# Patient Record
Sex: FEMALE | Race: OTHER | HISPANIC OR LATINO | Employment: FULL TIME | URBAN - METROPOLITAN AREA
[De-identification: names, ages, dates, MRNs, and addresses within clinical notes are randomized per-mention and may not be internally consistent; named-entity substitution may affect disease eponyms.]

---

## 2021-06-16 ENCOUNTER — HISTORICAL (OUTPATIENT)
Dept: RADIOLOGY | Facility: HOSPITAL | Age: 41
End: 2021-06-16

## 2021-07-01 ENCOUNTER — HISTORICAL (OUTPATIENT)
Dept: RADIOLOGY | Facility: HOSPITAL | Age: 41
End: 2021-07-01

## 2021-07-20 ENCOUNTER — HISTORICAL (OUTPATIENT)
Dept: RADIOLOGY | Facility: HOSPITAL | Age: 41
End: 2021-07-20

## 2021-09-02 ENCOUNTER — HISTORICAL (OUTPATIENT)
Dept: ADMINISTRATIVE | Facility: HOSPITAL | Age: 41
End: 2021-09-02

## 2021-09-08 ENCOUNTER — HISTORICAL (OUTPATIENT)
Dept: ADMINISTRATIVE | Facility: HOSPITAL | Age: 41
End: 2021-09-08

## 2021-09-08 LAB — SARS-COV-2 AG RESP QL IA.RAPID: POSITIVE

## 2021-09-09 ENCOUNTER — HISTORICAL (OUTPATIENT)
Dept: INFECTIOUS DISEASES | Facility: HOSPITAL | Age: 41
End: 2021-09-09

## 2021-10-07 ENCOUNTER — HISTORICAL (OUTPATIENT)
Dept: ADMINISTRATIVE | Facility: HOSPITAL | Age: 41
End: 2021-10-07

## 2021-10-12 ENCOUNTER — HISTORICAL (OUTPATIENT)
Dept: ADMINISTRATIVE | Facility: HOSPITAL | Age: 41
End: 2021-10-12

## 2021-10-12 LAB — SARS-COV-2 AG RESP QL IA.RAPID: NEGATIVE

## 2021-10-13 ENCOUNTER — HISTORICAL (OUTPATIENT)
Dept: SURGERY | Facility: HOSPITAL | Age: 41
End: 2021-10-13

## 2021-11-03 LAB — POC BETA-HCG (QUAL): POSITIVE

## 2022-04-10 ENCOUNTER — HISTORICAL (OUTPATIENT)
Dept: ADMINISTRATIVE | Facility: HOSPITAL | Age: 42
End: 2022-04-10

## 2022-04-26 VITALS
BODY MASS INDEX: 26.27 KG/M2 | HEIGHT: 60 IN | SYSTOLIC BLOOD PRESSURE: 113 MMHG | WEIGHT: 133.81 LBS | DIASTOLIC BLOOD PRESSURE: 73 MMHG | OXYGEN SATURATION: 98 %

## 2022-05-17 ENCOUNTER — HOSPITAL ENCOUNTER (OUTPATIENT)
Dept: RADIOLOGY | Facility: HOSPITAL | Age: 42
Discharge: HOME | End: 2022-05-17
Attending: STUDENT IN AN ORGANIZED HEALTH CARE EDUCATION/TRAINING PROGRAM

## 2022-05-17 DIAGNOSIS — N63.0 BREAST LUMP: ICD-10-CM

## 2022-05-17 DIAGNOSIS — D48.60: ICD-10-CM

## 2022-05-17 PROCEDURE — 76642 ULTRASOUND BREAST LIMITED: CPT | Mod: 26,RT,, | Performed by: RADIOLOGY

## 2022-05-17 PROCEDURE — 77066 MAMMO DIGITAL DIAGNOSTIC BILAT WITH TOMO: ICD-10-PCS | Mod: 26,,, | Performed by: RADIOLOGY

## 2022-05-17 PROCEDURE — 77066 DX MAMMO INCL CAD BI: CPT | Mod: 26,,, | Performed by: RADIOLOGY

## 2022-05-17 PROCEDURE — 76642 ULTRASOUND BREAST LIMITED: CPT | Mod: TC,RT

## 2022-05-17 PROCEDURE — 77062 MAMMO DIGITAL DIAGNOSTIC BILAT WITH TOMO: ICD-10-PCS | Mod: 26,,, | Performed by: RADIOLOGY

## 2022-05-17 PROCEDURE — 77062 BREAST TOMOSYNTHESIS BI: CPT | Mod: 26,,, | Performed by: RADIOLOGY

## 2022-05-17 PROCEDURE — 77066 DX MAMMO INCL CAD BI: CPT | Mod: TC

## 2022-05-17 PROCEDURE — 76642 US BREAST RIGHT LIMITED: ICD-10-PCS | Mod: 26,RT,, | Performed by: RADIOLOGY

## 2022-05-26 ENCOUNTER — OFFICE VISIT (OUTPATIENT)
Dept: SURGERY | Facility: CLINIC | Age: 42
End: 2022-05-26

## 2022-05-26 VITALS
BODY MASS INDEX: 26.02 KG/M2 | HEART RATE: 83 BPM | RESPIRATION RATE: 18 BRPM | OXYGEN SATURATION: 97 % | DIASTOLIC BLOOD PRESSURE: 80 MMHG | TEMPERATURE: 98 F | SYSTOLIC BLOOD PRESSURE: 130 MMHG | HEIGHT: 61 IN | WEIGHT: 137.81 LBS

## 2022-05-26 DIAGNOSIS — Z85.3 H/O MALIGNANT PHYLLOIDES TUMOR OF BREAST: Primary | ICD-10-CM

## 2022-05-26 DIAGNOSIS — Z01.818 PRE-OP TESTING: ICD-10-CM

## 2022-05-26 PROCEDURE — 99214 OFFICE O/P EST MOD 30 MIN: CPT | Mod: PBBFAC

## 2022-05-26 RX ORDER — HEPARIN SODIUM 5000 [USP'U]/ML
5000 INJECTION, SOLUTION INTRAVENOUS; SUBCUTANEOUS EVERY 8 HOURS
Status: CANCELLED | OUTPATIENT
Start: 2022-05-26

## 2022-05-26 RX ORDER — SODIUM CHLORIDE 9 MG/ML
INJECTION, SOLUTION INTRAVENOUS CONTINUOUS
Status: CANCELLED | OUTPATIENT
Start: 2022-05-26

## 2022-05-26 NOTE — PROGRESS NOTES
\[facility]\"" General Surgery   Clinic Note    Subjective:  Pt is a 42 yr old female with no significant PMH who presented to the breast surgery clinic for follow-up of imaging and scheduling of excisional biopsy. Pt developed a 2.2x1.5x2.5 right-sided breast mass a year ago located at the 10 o'clock position of the nipple, above the areola. No nipple drainage or pain from the mass. 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 Pt was scheduled twice to do an excisional biopsy of the right breast mass but had to schedule due to covid and spontaneous .     Menarche at 13. Still has periods and are regular. Has never been on hormonal medication, including birth control. Has had 2 completed vaginal pregnancies (, ). Has had one spontaneous  on 21. No family history of breast cancer, uterine cancer, and ovarian cancer.     Today, pt is doing well today. Pt denies right breast mass is growing in size. No new masses, nipple drainage, overlying skin changes, weight loss, and night sweats.      Objective:    Vitals:  Vitals:    22 1416   BP: 130/80   Pulse: 83   Resp: 18   Temp: 98.2 °F (36.8 °C)        Physical Exam:  Gen: NAD  Neuro: awake, alert, answering questions appropriately  CV: RRR  Resp: non-labored breathing, JULISSA  Abd: soft, ND, NT  Ext: moves all 4 spontaneously and purposefully  Skin: warm, well perfused  Breast: No tenderness, discharge, dimpling, puckering of nipple. [id number] mass on the right breast at the 10 o clock position     Labs:  N/A     Imaging:  Mammo Digital Diagnostic B/L with Gautam (22):   IMPRESSION: SUSPICIOUS OF MALIGNANCY  Right Breast: Suspicious abnormality (BI-RADS 4)  Left Breast: Negative (BI-RADS 1)    *Ultrasound (22):   Ultrasound Findings:  Targeted ultrasound was performed by a sonographer and a physician.  There is a 2.9 x 1.9 x 3 cm oval, parallel, hypoechoic mass with slight angular and indistinct  margins, peripheral vascularity, and posterior enhancement in the upper outer right breast at the 10:00 position, 2 cm from the nipple.  Echogenic focus is noted within this mass correlating with the hydromark biopsy clip.  It previously measured approximately 2.2 x 1.4 x 2.6 cm on 2021.   No abnormal right axillary lymph node is identified.    Micro/Path/Other:  *US guided core biopsy on right upper outer quadrant breast 2.2x1.4x2.6cm mass at 10 o clock position, 2cm from nipple (21):  Fibroepithelial lesion with cellular stroma and usual ductal hyperplasia, suggestive of a benign lesion such as a benign phyllodes tumor     Assessment/Plan:  Pt is a 42 yr old female with no significant PMH who presented to the breast surgery clinic for follow-up of imaging and scheduling of excisional biopsy. Pt developed a 2.2x1.5x2.5 right-sided breast mass a year ago located at the 10 o'clock position of the nipple, above the areola. No nipple drainage or pain from the mass. 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 Pt was scheduled twice to do an excisional biopsy of the right breast mass but had to schedule due to covid and spontaneous .     - Excisional biopsy of right breast mass on      Carolin Wade, MS3 [facility]-NO         PGY3 Addendum:  I examined the patient w/ MS3 Carolin Wade and agree w/ her documentation above. My brief assessment follows:    41 yo F w/ no PMH/PSH w/ h/o palpable R breast mass x1 year. No breast skin changes, nipple discharge axillary masses. Imaging last year revealed BI-RADS 4 lesion. Subsequent core needle bx showed benign phylloides tumor. Scheduled for surgery twice, but cancelled 2/2 COVID and then pregnancy loss. Presents again today for reschedule surgery. Repeat imaging since last visit shows growth of lesion from 2/2 cm to 2/9 cm.     Scheduled for excisional bx 22. Consent obtained today in clinic.      Salvador  Scheuermann, MD   [facility] General Surgery, PGY3  05/26/2022 3:08 PM

## 2022-05-27 NOTE — PROGRESS NOTES
I have reviewed the notes, assessments, and/or procedures performed by the resident, I concur with her/his documentation of Jovani Ward.     Margarita Wang MD

## 2022-06-06 ENCOUNTER — OFFICE VISIT (OUTPATIENT)
Dept: PREADMISSION TESTING | Facility: HOSPITAL | Age: 42
End: 2022-06-06
Attending: SURGERY

## 2022-06-06 VITALS — WEIGHT: 140.19 LBS | BODY MASS INDEX: 26.49 KG/M2

## 2022-06-06 DIAGNOSIS — Z01.818 PRE-OP TESTING: ICD-10-CM

## 2022-06-06 LAB — SARS-COV-2 RNA RESP QL NAA+PROBE: DETECTED

## 2022-06-06 PROCEDURE — 87635 SARS-COV-2 COVID-19 AMP PRB: CPT

## 2022-06-06 NOTE — PROGRESS NOTES
Pre op visit for procedure. Pre op assessment done and labs drawn Pre op instructions discussed and patient verbalized undestanding

## 2022-07-14 ENCOUNTER — OFFICE VISIT (OUTPATIENT)
Dept: SURGERY | Facility: CLINIC | Age: 42
End: 2022-07-14

## 2022-07-14 VITALS
HEART RATE: 55 BPM | TEMPERATURE: 98 F | DIASTOLIC BLOOD PRESSURE: 78 MMHG | WEIGHT: 137.56 LBS | SYSTOLIC BLOOD PRESSURE: 120 MMHG | RESPIRATION RATE: 18 BRPM | HEIGHT: 61 IN | BODY MASS INDEX: 25.97 KG/M2 | OXYGEN SATURATION: 100 %

## 2022-07-14 DIAGNOSIS — N63.10 BREAST MASS, RIGHT: ICD-10-CM

## 2022-07-14 DIAGNOSIS — Z85.3 H/O MALIGNANT PHYLLOIDES TUMOR OF BREAST: Primary | ICD-10-CM

## 2022-07-14 PROCEDURE — 99215 OFFICE O/P EST HI 40 MIN: CPT | Mod: PBBFAC

## 2022-07-14 RX ORDER — SODIUM CHLORIDE 9 MG/ML
INJECTION, SOLUTION INTRAVENOUS CONTINUOUS
Status: CANCELLED | OUTPATIENT
Start: 2022-07-14

## 2022-07-14 RX ORDER — ONDANSETRON 4 MG/1
8 TABLET, ORALLY DISINTEGRATING ORAL EVERY 8 HOURS PRN
Status: CANCELLED | OUTPATIENT
Start: 2022-07-14

## 2022-07-14 NOTE — PROGRESS NOTES
"Chief complaint: rescheduling excisional biopsy     HPI:   Jovani Ward is a South African speaking  42 y.o. female with no significant PMHx reports today to reschedule surgery for excision of R breast mass.  Pt core biopsy revealed phyllodes tumor. Previously scheduled for lumpectomy 22 was cancelled due to COVID-19 positive. Pt denies any noticeable changes to the size of the mass, nipple drainage, discoloration, or new growths. She does report pain associated with the mass which she describes as "throbbing." Denies radiation of pain to any other areas. Denies fhx of breast cancer, ovarian cancer, or uterine cancer.     ROS:  Negative except for as stated above       PMHx:  has no past medical history on file.  PSHx:  has no past surgical history on file.  FamHx: family history is not on file.  SocHx:  reports that she has never smoked. She has never used smokeless tobacco. She reports previous alcohol use of about 2.0 - 3.0 standard drinks of alcohol per week. She reports that she does not use drugs.    Physical Exam:  Vitals: There were no vitals taken for this visit.  General: No acute distress   Skin: no rash or edema noted      Imagin2021: US-guided core biopsy of a right upper outer breast 2.2 x 1.4 x 2.6 cm mass at the 10 o'clock position, 2 cm from the nipple, demarcated by a hydromark butterfly-shaped biopsy clip with pathology revealing:  Fibroepithelial lesion with cellular stroma and usual ductal hyperplasia, suggestive of a benign lesion such as a benign phyllodes tumor.     Mammo Digital Diagnostic B/L with Gautam (22):   IMPRESSION: SUSPICIOUS OF MALIGNANCY  Right Breast: Suspicious abnormality (BI-RADS 4)  Left Breast: Negative (BI-RADS 1)     *Ultrasound (22):   Ultrasound Findings:  Targeted ultrasound was performed by a sonographer and a physician.  There is a 2.9 x 1.9 x 3 cm oval, parallel, hypoechoic mass with slight angular and indistinct margins, peripheral " vascularity, and posterior enhancement in the upper outer right breast at the 10:00 position, 2 cm from the nipple.  Echogenic focus is noted within this mass correlating with the hydromark biopsy clip.  It previously measured approximately 2.2 x 1.4 x 2.6 cm on 07/01/2021.   No abnormal right axillary lymph node is identified    Assessment and Plan:    Ms. Osborn is a 41 y/o F with a R sided breast mass, pathology consistent with phyllodes tumor.      -Scheduled excisional biopsy for 8/10/22 (on vacation at end of July and would like to schedule after)   -Received informed consent       Deedee Vee   [facility] MS3     .PGY 5 Addendum    Patient seen and examined with medical student. Agree as above. Changes to note made as needed.     Misa Renae  1:23 PM  7/14/2022

## 2022-07-14 NOTE — H&P (VIEW-ONLY)
"Chief complaint: rescheduling excisional biopsy     HPI:   Jovani Ward is a South African speaking  42 y.o. female with no significant PMHx reports today to reschedule surgery for excision of R breast mass.  Pt core biopsy revealed phyllodes tumor. Previously scheduled for lumpectomy 22 was cancelled due to COVID-19 positive. Pt denies any noticeable changes to the size of the mass, nipple drainage, discoloration, or new growths. She does report pain associated with the mass which she describes as "throbbing." Denies radiation of pain to any other areas. Denies fhx of breast cancer, ovarian cancer, or uterine cancer.     ROS:  Negative except for as stated above       PMHx:  has no past medical history on file.  PSHx:  has no past surgical history on file.  FamHx: family history is not on file.  SocHx:  reports that she has never smoked. She has never used smokeless tobacco. She reports previous alcohol use of about 2.0 - 3.0 standard drinks of alcohol per week. She reports that she does not use drugs.    Physical Exam:  Vitals: There were no vitals taken for this visit.  General: No acute distress   Skin: no rash or edema noted      Imagin2021: US-guided core biopsy of a right upper outer breast 2.2 x 1.4 x 2.6 cm mass at the 10 o'clock position, 2 cm from the nipple, demarcated by a hydromark butterfly-shaped biopsy clip with pathology revealing:  Fibroepithelial lesion with cellular stroma and usual ductal hyperplasia, suggestive of a benign lesion such as a benign phyllodes tumor.     Mammo Digital Diagnostic B/L with Gautam (22):   IMPRESSION: SUSPICIOUS OF MALIGNANCY  Right Breast: Suspicious abnormality (BI-RADS 4)  Left Breast: Negative (BI-RADS 1)     *Ultrasound (22):   Ultrasound Findings:  Targeted ultrasound was performed by a sonographer and a physician.  There is a 2.9 x 1.9 x 3 cm oval, parallel, hypoechoic mass with slight angular and indistinct margins, peripheral " vascularity, and posterior enhancement in the upper outer right breast at the 10:00 position, 2 cm from the nipple.  Echogenic focus is noted within this mass correlating with the hydromark biopsy clip.  It previously measured approximately 2.2 x 1.4 x 2.6 cm on 07/01/2021.   No abnormal right axillary lymph node is identified    Assessment and Plan:    Ms. Osborn is a 41 y/o F with a R sided breast mass, pathology consistent with phyllodes tumor.      -Scheduled excisional biopsy for 8/10/22 (on vacation at end of July and would like to schedule after)   -Received informed consent       Deedee Vee   [facility] MS3     .PGY 5 Addendum    Patient seen and examined with medical student. Agree as above. Changes to note made as needed.     Misa Renae  1:23 PM  7/14/2022

## 2022-07-14 NOTE — PROGRESS NOTES
Pt seen by Dr. Renae;  used w/ID #: [id number]; Scheduled for a right excisional breast biopsy on 10Aug2022; Surgery packet completed; Pt instructed to return to clinic 2 weeks after surgery for post op appt; Discharge paperwork given w/pt verbalizing understanding

## 2022-07-19 ENCOUNTER — ANESTHESIA EVENT (OUTPATIENT)
Dept: SURGERY | Facility: HOSPITAL | Age: 42
End: 2022-07-19

## 2022-07-19 NOTE — ANESTHESIA PREPROCEDURE EVALUATION
2022  Jovani Ward is a 42 y.o., female.    COVID STATUS: 21 COVID + (ASYMPTOMATIC; 21 MONOCLONAL Ab INFUSION); 10/12/21 COVID=NEG; 22 COVID +; NO NEED for TESTING  BETA-BLOCKER: NONE     [facility], 10/7/21 & PAT NURSE PHONE INTERVIEW 22     PROBLEM LIST:  -  RIGHT BREAST BENIGN TUMOR POSSIBLE PHYLLODES      - S/P 21 RIGHT UPPER OUTER BREAST 2.2 x 1.4 x 2.6 cm MASS NEEDLE BIOSPY - FIBROEPITHELIAL LESION WITH CELLULAR STROMA AND USUALLY DUCTAL HYPERPLASIA, SUGGESTIVE OF A BENIGN LESION SUCH AS A BENIGN PHYLLODES TUMOR.      - 2021 SURGERY CANCELLED  COVID +      - 10/2021 SURGERY CANCELLED  PREGNANCY --> 21 SPONTANEOUS       - 22 SURGERY CANCELLED  COVID +  -  UPT STATUS - neg DOS   -  GERD - LATE NIGHT EATING TRIGGERS, OTC TUMS PRN w/RELIEF  -  Kiswahili SPEAKING -  USED by SAY CHUN LPN         There were no vitals filed for this visit.    Lab Results   Component Value Date    WBC 6.1 2021    HGB 12.4 2021    HCT 34.6 (L) 2021     2021    ALT 12 2021    AST 13 2021     2021    K 3.1 (L) 2021    CREATININE 0.71 2021    BUN 11.4 2021    CO2 20 (L) 2021       AM Rx DOS: NONE     ORDERS -   SURGEON: 11/3/21 CMP; 21 CBC; NO NEW ORDERS  ANESTHESIA: UPT  Pre-op Assessment    I have reviewed the Patient Summary Reports.     I have reviewed the Nursing Notes. I have reviewed the NPO Status.   I have reviewed the Medications.     Review of Systems  Anesthesia Hx:  No problems with previous Anesthesia  History of prior surgery of interest to airway management or planning: Denies Family Hx of Anesthesia complications.   Denies Personal Hx of Anesthesia complications.   Hematology/Oncology:  Hematology Normal   Oncology Normal      EENT/Dental:EENT/Dental Normal   Cardiovascular:  Cardiovascular Normal     Pulmonary:  Pulmonary Normal    Renal/:  Renal/ Normal     Hepatic/GI:  Hepatic/GI Normal    Musculoskeletal:  Musculoskeletal Normal    Neurological:  Neurology Normal    Endocrine:  Endocrine Normal    Dermatological:  Skin Normal    Psych:  Psychiatric Normal           Physical Exam  General: Well nourished, Cooperative, Alert and Oriented    Airway:  Mallampati: I / I  Mouth Opening: Normal  TM Distance: Normal  Tongue: Large, Normal  Neck ROM: Normal ROM    Dental:  Intact        Anesthesia Plan  Type of Anesthesia, risks & benefits discussed:    Anesthesia Type: Gen Supraglottic Airway  Intra-op Monitoring Plan: Standard ASA Monitors  Post Op Pain Control Plan: IV/PO Opioids PRN  Induction:  IV  Airway Plan: Direct  Informed Consent: Informed consent signed with the Patient representative and all parties understand the risks and agree with anesthesia plan.  All questions answered. Patient consented to blood products? No  ASA Score: 1  Day of Surgery Review of History & Physical: H&P Update referred to the surgeon/provider.I have interviewed and examined the patient. I have reviewed the patient's H&P dated: There are no significant changes. H&P completed by Anesthesiologist.    Ready For Surgery From Anesthesia Perspective.     .

## 2022-08-10 ENCOUNTER — HOSPITAL ENCOUNTER (OUTPATIENT)
Facility: HOSPITAL | Age: 42
Discharge: HOME | End: 2022-08-10
Attending: SURGERY | Admitting: SURGERY

## 2022-08-10 ENCOUNTER — ANESTHESIA (OUTPATIENT)
Dept: SURGERY | Facility: HOSPITAL | Age: 42
End: 2022-08-10

## 2022-08-10 VITALS
WEIGHT: 134.25 LBS | TEMPERATURE: 98 F | BODY MASS INDEX: 25.37 KG/M2 | SYSTOLIC BLOOD PRESSURE: 124 MMHG | HEART RATE: 72 BPM | RESPIRATION RATE: 20 BRPM | OXYGEN SATURATION: 99 % | DIASTOLIC BLOOD PRESSURE: 82 MMHG

## 2022-08-10 DIAGNOSIS — N63.10 BREAST MASS, RIGHT: ICD-10-CM

## 2022-08-10 DIAGNOSIS — Z85.3 H/O MALIGNANT PHYLLOIDES TUMOR OF BREAST: ICD-10-CM

## 2022-08-10 LAB
B-HCG UR QL: NEGATIVE
CTP QC/QA: YES

## 2022-08-10 PROCEDURE — 71000033 HC RECOVERY, INTIAL HOUR: Performed by: SURGERY

## 2022-08-10 PROCEDURE — 63600175 PHARM REV CODE 636 W HCPCS: Performed by: SPECIALIST

## 2022-08-10 PROCEDURE — 81025 URINE PREGNANCY TEST: CPT | Performed by: NURSE PRACTITIONER

## 2022-08-10 PROCEDURE — 25000003 PHARM REV CODE 250: Performed by: SURGERY

## 2022-08-10 PROCEDURE — 71000015 HC POSTOP RECOV 1ST HR: Performed by: SURGERY

## 2022-08-10 PROCEDURE — 37000008 HC ANESTHESIA 1ST 15 MINUTES: Performed by: SURGERY

## 2022-08-10 PROCEDURE — 71000016 HC POSTOP RECOV ADDL HR: Performed by: SURGERY

## 2022-08-10 PROCEDURE — 37000009 HC ANESTHESIA EA ADD 15 MINS: Performed by: SURGERY

## 2022-08-10 PROCEDURE — 63600175 PHARM REV CODE 636 W HCPCS: Performed by: NURSE ANESTHETIST, CERTIFIED REGISTERED

## 2022-08-10 PROCEDURE — 36000706: Performed by: SURGERY

## 2022-08-10 PROCEDURE — 36000707: Performed by: SURGERY

## 2022-08-10 PROCEDURE — 25000003 PHARM REV CODE 250: Performed by: NURSE ANESTHETIST, CERTIFIED REGISTERED

## 2022-08-10 RX ORDER — MEPERIDINE HYDROCHLORIDE 25 MG/ML
12.5 INJECTION INTRAMUSCULAR; INTRAVENOUS; SUBCUTANEOUS ONCE
Status: DISCONTINUED | OUTPATIENT
Start: 2022-08-10 | End: 2022-08-10 | Stop reason: SDUPTHER

## 2022-08-10 RX ORDER — MIDAZOLAM HYDROCHLORIDE 1 MG/ML
2 INJECTION INTRAMUSCULAR; INTRAVENOUS ONCE AS NEEDED
Status: COMPLETED | OUTPATIENT
Start: 2022-08-10 | End: 2022-08-10

## 2022-08-10 RX ORDER — LIDOCAINE HCL/PF 100 MG/5ML
SYRINGE (ML) INTRAVENOUS
Status: DISCONTINUED | OUTPATIENT
Start: 2022-08-10 | End: 2022-08-10

## 2022-08-10 RX ORDER — PROPOFOL 10 MG/ML
INJECTION, EMULSION INTRAVENOUS
Status: DISCONTINUED | OUTPATIENT
Start: 2022-08-10 | End: 2022-08-10

## 2022-08-10 RX ORDER — HYDROMORPHONE HYDROCHLORIDE 1 MG/ML
0.2 INJECTION, SOLUTION INTRAMUSCULAR; INTRAVENOUS; SUBCUTANEOUS EVERY 5 MIN PRN
Status: DISCONTINUED | OUTPATIENT
Start: 2022-08-10 | End: 2022-08-10 | Stop reason: SDUPTHER

## 2022-08-10 RX ORDER — SODIUM CHLORIDE 9 MG/ML
INJECTION, SOLUTION INTRAVENOUS CONTINUOUS
Status: DISCONTINUED | OUTPATIENT
Start: 2022-08-10 | End: 2022-08-10 | Stop reason: HOSPADM

## 2022-08-10 RX ORDER — DEXAMETHASONE SODIUM PHOSPHATE 4 MG/ML
INJECTION, SOLUTION INTRA-ARTICULAR; INTRALESIONAL; INTRAMUSCULAR; INTRAVENOUS; SOFT TISSUE
Status: DISCONTINUED | OUTPATIENT
Start: 2022-08-10 | End: 2022-08-10

## 2022-08-10 RX ORDER — IPRATROPIUM BROMIDE AND ALBUTEROL SULFATE 2.5; .5 MG/3ML; MG/3ML
3 SOLUTION RESPIRATORY (INHALATION) ONCE AS NEEDED
Status: DISCONTINUED | OUTPATIENT
Start: 2022-08-10 | End: 2022-08-10 | Stop reason: HOSPADM

## 2022-08-10 RX ORDER — DIPHENHYDRAMINE HYDROCHLORIDE 50 MG/ML
25 INJECTION INTRAMUSCULAR; INTRAVENOUS ONCE AS NEEDED
Status: DISCONTINUED | OUTPATIENT
Start: 2022-08-10 | End: 2022-08-10 | Stop reason: HOSPADM

## 2022-08-10 RX ORDER — OXYCODONE AND ACETAMINOPHEN 5; 325 MG/1; MG/1
2 TABLET ORAL
Status: DISCONTINUED | OUTPATIENT
Start: 2022-08-10 | End: 2022-08-10 | Stop reason: HOSPADM

## 2022-08-10 RX ORDER — HYDROMORPHONE HYDROCHLORIDE 1 MG/ML
0.2 INJECTION, SOLUTION INTRAMUSCULAR; INTRAVENOUS; SUBCUTANEOUS EVERY 5 MIN PRN
Status: DISCONTINUED | OUTPATIENT
Start: 2022-08-10 | End: 2022-08-10 | Stop reason: HOSPADM

## 2022-08-10 RX ORDER — KETOROLAC TROMETHAMINE 30 MG/ML
INJECTION, SOLUTION INTRAMUSCULAR; INTRAVENOUS
Status: DISCONTINUED | OUTPATIENT
Start: 2022-08-10 | End: 2022-08-10

## 2022-08-10 RX ORDER — MEPERIDINE HYDROCHLORIDE 25 MG/ML
12.5 INJECTION INTRAMUSCULAR; INTRAVENOUS; SUBCUTANEOUS ONCE
Status: DISCONTINUED | OUTPATIENT
Start: 2022-08-10 | End: 2022-08-10 | Stop reason: HOSPADM

## 2022-08-10 RX ORDER — CEFAZOLIN SODIUM 1 G/3ML
INJECTION, POWDER, FOR SOLUTION INTRAMUSCULAR; INTRAVENOUS
Status: DISCONTINUED | OUTPATIENT
Start: 2022-08-10 | End: 2022-08-10

## 2022-08-10 RX ORDER — FENTANYL CITRATE 50 UG/ML
INJECTION, SOLUTION INTRAMUSCULAR; INTRAVENOUS
Status: DISCONTINUED | OUTPATIENT
Start: 2022-08-10 | End: 2022-08-10

## 2022-08-10 RX ORDER — BUPIVACAINE HYDROCHLORIDE 2.5 MG/ML
INJECTION, SOLUTION EPIDURAL; INFILTRATION; INTRACAUDAL
Status: DISCONTINUED | OUTPATIENT
Start: 2022-08-10 | End: 2022-08-10 | Stop reason: HOSPADM

## 2022-08-10 RX ORDER — BUPIVACAINE HYDROCHLORIDE 2.5 MG/ML
INJECTION, SOLUTION EPIDURAL; INFILTRATION; INTRACAUDAL
Status: DISCONTINUED
Start: 2022-08-10 | End: 2022-08-10 | Stop reason: HOSPADM

## 2022-08-10 RX ORDER — MIDAZOLAM HYDROCHLORIDE 1 MG/ML
INJECTION INTRAMUSCULAR; INTRAVENOUS
Status: DISCONTINUED
Start: 2022-08-10 | End: 2022-08-10 | Stop reason: HOSPADM

## 2022-08-10 RX ORDER — OXYCODONE AND ACETAMINOPHEN 5; 325 MG/1; MG/1
2 TABLET ORAL
Status: DISCONTINUED | OUTPATIENT
Start: 2022-08-10 | End: 2022-08-10 | Stop reason: SDUPTHER

## 2022-08-10 RX ORDER — ONDANSETRON 2 MG/ML
4 INJECTION INTRAMUSCULAR; INTRAVENOUS ONCE
Status: DISCONTINUED | OUTPATIENT
Start: 2022-08-10 | End: 2022-08-10 | Stop reason: HOSPADM

## 2022-08-10 RX ORDER — HYDROMORPHONE HYDROCHLORIDE 1 MG/ML
0.5 INJECTION, SOLUTION INTRAMUSCULAR; INTRAVENOUS; SUBCUTANEOUS EVERY 5 MIN PRN
Status: DISCONTINUED | OUTPATIENT
Start: 2022-08-10 | End: 2022-08-10 | Stop reason: HOSPADM

## 2022-08-10 RX ORDER — PROCHLORPERAZINE EDISYLATE 5 MG/ML
5 INJECTION INTRAMUSCULAR; INTRAVENOUS ONCE AS NEEDED
Status: DISCONTINUED | OUTPATIENT
Start: 2022-08-10 | End: 2022-08-10 | Stop reason: SDUPTHER

## 2022-08-10 RX ORDER — HYDROMORPHONE HYDROCHLORIDE 1 MG/ML
0.5 INJECTION, SOLUTION INTRAMUSCULAR; INTRAVENOUS; SUBCUTANEOUS EVERY 5 MIN PRN
Status: DISCONTINUED | OUTPATIENT
Start: 2022-08-10 | End: 2022-08-10 | Stop reason: SDUPTHER

## 2022-08-10 RX ORDER — PROCHLORPERAZINE EDISYLATE 5 MG/ML
5 INJECTION INTRAMUSCULAR; INTRAVENOUS ONCE AS NEEDED
Status: DISCONTINUED | OUTPATIENT
Start: 2022-08-10 | End: 2022-08-10 | Stop reason: HOSPADM

## 2022-08-10 RX ORDER — ONDANSETRON 2 MG/ML
INJECTION INTRAMUSCULAR; INTRAVENOUS
Status: DISCONTINUED | OUTPATIENT
Start: 2022-08-10 | End: 2022-08-10

## 2022-08-10 RX ORDER — LIDOCAINE HYDROCHLORIDE 10 MG/ML
1 INJECTION, SOLUTION EPIDURAL; INFILTRATION; INTRACAUDAL; PERINEURAL ONCE
Status: ACTIVE | OUTPATIENT
Start: 2022-08-10

## 2022-08-10 RX ORDER — SODIUM CHLORIDE, SODIUM LACTATE, POTASSIUM CHLORIDE, CALCIUM CHLORIDE 600; 310; 30; 20 MG/100ML; MG/100ML; MG/100ML; MG/100ML
INJECTION, SOLUTION INTRAVENOUS CONTINUOUS
Status: DISCONTINUED | OUTPATIENT
Start: 2022-08-10 | End: 2022-08-10 | Stop reason: HOSPADM

## 2022-08-10 RX ORDER — ONDANSETRON 2 MG/ML
4 INJECTION INTRAMUSCULAR; INTRAVENOUS ONCE
Status: DISCONTINUED | OUTPATIENT
Start: 2022-08-10 | End: 2022-08-10 | Stop reason: SDUPTHER

## 2022-08-10 RX ORDER — HYDROCODONE BITARTRATE AND ACETAMINOPHEN 5; 325 MG/1; MG/1
1 TABLET ORAL EVERY 6 HOURS PRN
Qty: 10 TABLET | Refills: 0 | Status: SHIPPED | OUTPATIENT
Start: 2022-08-10

## 2022-08-10 RX ORDER — CEFAZOLIN SODIUM 2 G/50ML
2 SOLUTION INTRAVENOUS
Status: DISCONTINUED | OUTPATIENT
Start: 2022-08-10 | End: 2022-08-10 | Stop reason: HOSPADM

## 2022-08-10 RX ORDER — ONDANSETRON 4 MG/1
8 TABLET, ORALLY DISINTEGRATING ORAL EVERY 8 HOURS PRN
Status: DISCONTINUED | OUTPATIENT
Start: 2022-08-10 | End: 2022-08-10 | Stop reason: HOSPADM

## 2022-08-10 RX ORDER — DIPHENHYDRAMINE HYDROCHLORIDE 50 MG/ML
25 INJECTION INTRAMUSCULAR; INTRAVENOUS ONCE AS NEEDED
Status: DISCONTINUED | OUTPATIENT
Start: 2022-08-10 | End: 2022-08-10 | Stop reason: SDUPTHER

## 2022-08-10 RX ADMIN — FENTANYL CITRATE 25 MCG: 50 INJECTION, SOLUTION INTRAMUSCULAR; INTRAVENOUS at 08:08

## 2022-08-10 RX ADMIN — LIDOCAINE HYDROCHLORIDE 50 MG: 20 INJECTION, SOLUTION INTRAVENOUS at 07:08

## 2022-08-10 RX ADMIN — MIDAZOLAM 2 MG: 1 INJECTION INTRAMUSCULAR; INTRAVENOUS at 06:08

## 2022-08-10 RX ADMIN — ONDANSETRON 4 MG: 2 INJECTION INTRAMUSCULAR; INTRAVENOUS at 08:08

## 2022-08-10 RX ADMIN — DEXAMETHASONE SODIUM PHOSPHATE 8 MG: 4 INJECTION, SOLUTION INTRA-ARTICULAR; INTRALESIONAL; INTRAMUSCULAR; INTRAVENOUS; SOFT TISSUE at 07:08

## 2022-08-10 RX ADMIN — SODIUM CHLORIDE, POTASSIUM CHLORIDE, SODIUM LACTATE AND CALCIUM CHLORIDE: 600; 310; 30; 20 INJECTION, SOLUTION INTRAVENOUS at 05:08

## 2022-08-10 RX ADMIN — CEFAZOLIN 2 G: 330 INJECTION, POWDER, FOR SOLUTION INTRAMUSCULAR; INTRAVENOUS at 07:08

## 2022-08-10 RX ADMIN — FENTANYL CITRATE 50 MCG: 50 INJECTION, SOLUTION INTRAMUSCULAR; INTRAVENOUS at 07:08

## 2022-08-10 RX ADMIN — PROPOFOL 150 MG: 10 INJECTION, EMULSION INTRAVENOUS at 07:08

## 2022-08-10 RX ADMIN — FENTANYL CITRATE 25 MCG: 50 INJECTION, SOLUTION INTRAMUSCULAR; INTRAVENOUS at 07:08

## 2022-08-10 RX ADMIN — KETOROLAC TROMETHAMINE 30 MG: 30 INJECTION, SOLUTION INTRAMUSCULAR; INTRAVENOUS at 07:08

## 2022-08-10 NOTE — LETTER
August 10, 2022         [address] [facility] [address]  Phone: [phone]  Fax: [phone]       Patient: Jovani Ward   YOB: 1980  Date of Visit: 08/10/2022    To Whom It May Concern:    Jovani Ward  was at [facility] on 08/10/2022. The patient may return to work/school on 08/17/2022 with restrictions-no heavy lifting over 10 pounds or strenuous activity until August 24th. If you have any questions or concerns, or if I can be of further assistance, please do not hesitate to contact me.    Sincerely,    David Madden RN

## 2022-08-10 NOTE — ANESTHESIA PROCEDURE NOTES
Intubation    Date/Time: 8/10/2022 7:21 AM  Performed by: Modesto Akers CRNA  Authorized by: Modesto Akers CRNA     Intubation:     Induction:  Intravenous    Intubated:  Postinduction    Mask Ventilation:  Easy mask    Attempts:  1    Attempted By:  CRNA    Method of Intubation:  Direct    Laryngeal View Grade: Laryngeal Manipulation      Laryngeal View Grade (2nd Attempt): Laryngeal Manipulation      Laryngeal View Grade (3rd Attempt): Laryngeal Manipulation      Difficult Airway Encountered?: No      Complications:  None    Airway Device:  Supraglottic airway/LMA    Airway Device Size:  4.0    Tube type: i gel.    Secured at:  The lips    Placement Verified By:  Capnometry    Complicating Factors:  None    Findings Post-Intubation:  BS equal bilateral and atraumatic/condition of teeth unchanged

## 2022-08-10 NOTE — ANESTHESIA POSTPROCEDURE EVALUATION
Anesthesia Post Evaluation    Patient: Jovani Ward    Procedure(s) Performed: Procedure(s) (LRB):  EXCISION, MASS, BREAST (Right)    Final Anesthesia Type: general      Patient location during evaluation: PACU  Patient participation: Yes- Able to Participate  Level of consciousness: awake and responds to stimulation  Post-procedure vital signs: reviewed and stable  Pain management: adequate  Airway patency: patent    PONV status at discharge: No PONV  Anesthetic complications: no      Cardiovascular status: blood pressure returned to baseline  Respiratory status: unassisted  Hydration status: euvolemic  Follow-up not needed.          Vitals Value Taken Time   /82 08/10/22 0935   Temp 36.5 °C (97.7 °F) 08/10/22 0935   Pulse 72 08/10/22 0935   Resp 20 08/10/22 0935   SpO2 99 % 08/10/22 0935         Event Time   Out of Recovery 08:33:00         Pain/Joseph Score: Joseph Score: 10 (8/10/2022  8:35 AM)

## 2022-08-10 NOTE — TRANSFER OF CARE
Anesthesia Transfer of Care Note    Patient: Jovani Ward    Procedure(s) Performed: Procedure(s) (LRB):  EXCISION, MASS, BREAST (Right)    Patient location: PACU    Anesthesia Type: general    Transport from OR: Transported from OR on room air with adequate spontaneous ventilation    Post pain: adequate analgesia    Post assessment: no apparent anesthetic complications    Post vital signs: stable    Level of consciousness: awake and sedated    Nausea/Vomiting: no nausea/vomiting    Complications: none    Transfer of care protocol was followed      Last vitals:   Visit Vitals  /70 (BP Location: Right arm, Patient Position: Lying)   Pulse 88   Temp 36.2 °C (97.2 °F) (Temporal)   Resp 20   Wt 60.9 kg (134 lb 4.2 oz)   SpO2 100%   Breastfeeding No   BMI 25.37 kg/m²

## 2022-08-10 NOTE — DISCHARGE INSTRUCTIONS
· Keep follow up appointment on August 23rd  at 8:30 am in [facility].    · No heavy lifting or straining over 10 pounds or strenuous activities for 2 weeks.    · You may take a shower starting tomorrow evening. Wash GENTLY with soap and water (do not scrub) over incision, rinse with water, and pat dry.    · Do not soak your wound in water for two weeks. Do not take baths, swim, or use a hot tub until your doctor says it is okay.    · Use pain medication as instructed. You may alternate Ibuprofen and Tylenol every 4 hours as needed for pain or discomfort and Norco in place of Tylenol for breakthrough pain.  Do not take Tylenol (acetaminophen) with your NORCO, since NORCO contains Tylenol as well.    · You may use an ice pack as needed for 20 minutes at a time over your incision site to minimize swelling and help relieve pain.    · Do not drink alcohol or drive today, or as long as you are on pain medication.    · Notify MD of any moderate to severe pain unrelieved by pain medicine, if your incision opens and/or bleeds, or for any signs of infection including fever above 100.4, excessive redness or swelling, yellow/green foul- smelling drainage, nausea or vomiting. Clinics number is [phone]. If it is after business hours or emergency call [phone] and state Im having post op complications and need to speak to the surgeon on call.    ·Thanks for choosing [facility]! Have a smooth recovery!      Mantener argelia de seguimiento el 23 de jalen a las 8:30 am en [facility] [place].    · No levantar objetos pesados ni esforzarse más de 10 libras ni realizar actividades extenuantes leslie 2 semanas.    · Puede ricki monique ducha a partir de mañana por la noche. Lave SUAVEMENTE con agua y jabón (no frote) sobre la incisión, enjuague con agua y seque.    · No sumerja wilson herida en agua leslie dos semanas. No se bañe, nade ni use un jacuzzi hasta que wilson médico lo autorice.    · Use analgésicos según las instrucciones. Puede  "alternar ibuprofeno y Tylenol cada 4 horas según sea necesario para el dolor o la incomodidad y Norco en lugar de Tylenol para el dolor irruptivo. No tome Tylenol (acetaminofén) con wilson NORCO, ya que NORCO también contiene Tylenol.    · Puede usar monique bolsa de hielo según sea necesario leslie 20 minutos a la vez sobre el sitio de la incisión para minimizar la hinchazón y ayudar a aliviar el dolor.    · No monserrat alcohol ni maneje hoy, o mientras esté tomando analgésicos.    · Notificar al médico de cualquier dolor de moderado a intenso que no se alivie con analgésicos, si la incisión se abre y/o sangra, o cualquier signo de infección, kaela fiebre superior a 100,4, enrojecimiento o hinchazón excesivos, secreción maloliente de color amarillo/lili, náuseas o vómitos . El número de la clínica es [phone]. Si es después del horario comercial o de emergencia, llame al [phone] y diga "Tengo complicaciones posoperatorias y necesito hablar con el cirujano de kayleen".    ·¡Johnny por elegir [facility]! ¡Que tengas monique recuperación tranquila!  "

## 2022-08-10 NOTE — DISCHARGE SUMMARY
[facility]  Discharge Note  Short Stay    Procedure(s) (LRB):  EXCISION, MASS, BREAST (Right)    OUTCOME: Patient tolerated treatment/procedure well without complication and is now ready for discharge.    DISPOSITION: Home or Self Care    FINAL DIAGNOSIS:  Breast mass, right    FOLLOWUP: In clinic    DISCHARGE INSTRUCTIONS:    Discharge Procedure Orders   Diet Adult Regular     Lifting restrictions   Order Comments: No more than 10 pounds     Notify your health care provider if you experience any of the following:  temperature >100.4     Notify your health care provider if you experience any of the following:  severe uncontrolled pain     Change dressing (specify)   Order Comments: Keep steri strips on until they fall off on their own. May shower, but don't submerge     Activity as tolerated        TIME SPENT ON DISCHARGE: 30 minutes

## 2022-08-10 NOTE — OP NOTE
[facility]  General Surgery  Operative Note    Date of Procedure: 08/10/2022     Procedure: Mass excision right breast    Surgeon(s) and Role:  Staff: Margarita Wang MD  Resident(s): MD Misa aHrrell MD    Pre-Operative Diagnosis: Phyllodes tumor    Post-Operative Diagnosis: Same    Anesthesia: General    Operative Findings: Firm right breast mass    Description of Technical Procedures:   The patient was consented prior to procedure. All risks, benefits, alternatives explained. The patient was brought into the operative suite and placed in supine position. Anesthesia induced. A timeout was held indicating correct patient, procedure, site, preoperative antibiotics and VTE prophylaxis. The Right breast was prepped and draped in the standard fashion.      Using a 15 blade, an approximately 4 cm long, curved incision was made over the mass approximately 2 cm lateral to the right areola. Electrocautery was then used to carefully dissect around the mass until it was completely free. Hemostasis was achieved with electrocautery, and the incision was irrigated with saline solution. A 4-0 Monocryl was used to close the incision using a running subcuticular stitch. The incision was then covered with mastesol and steri strips.     All counts correct x 2. The patient tolerated the procedure well, was extubated and transferred to PACU in stable condition. Dr. Wang was present for the entire procedure.       Estimated Blood Loss (EBL): 5 cc           Implants: none    Drains: none    Specimens: Right breast mass sent to pathology    Complications: None            Condition: good    Disposition: home    Edgar Maher MD   [facility], PGY1  08/10/2022 12:56 PM

## 2022-08-10 NOTE — OP NOTE
[facility] - [facility]iop Services  Surgery Department  Operative Note    SUMMARY     Date of Procedure: 8/10/2022     Procedure: Procedure(s) (LRB):  EXCISION, MASS, BREAST (Right)     Surgeon(s) and Role:     * Margarita Wang MD - Primary     * Misa Renae MD - Resident - Assisting     * Edgar Maher MD - Resident - Assisting        Pre-Operative Diagnosis:   1. Right breast phyllodes tumor     Post-Operative Diagnosis: same     Anesthesia: General    Operative Findings (including complications, if any): 3 x 2 x 2 cm right breast phyllodes tumor; marked short skin, long lateral, double inferior     Description of Technical Procedures: The patient was consented prior to procedure. All risks, benefits, alternatives explained. The patient was brought into the operative suite and placed in supine position. Anesthesia induced. A timeout was held indicating correct patient, procedure, site, preoperative antibiotics and VTE prophylaxis. The right breast was prepped and draped in the standard fashion.     The right breast mass which was previously biopsied as phyllodes tumor was approximately 1 cm from the nipple at the 10:00 position. A curvilinear incision was made overlying this palpable lesion with a 15 blade. The incision was carried through the subcutaneous tissue with bovie cautery until the lesion was encountered. The lesion did appear well encapsulated and was dissected from the surrounding breast tissue. It did appear more adherent on the posterior surface so a margin of normal breast tissue was excised with the lesion. Once the lesion was excised, it was marked with short skin, long lateral, double inferior. It measured 3 x 2 x 2 cm and was passed off for final pathology. The cavity was inspected and hemostatic. The skin was closed with a running 4-0 monocryl and dressings applied.     All counts correct x 2. The patient tolerated the procedure well, was extubated and transferred to PACU in stable  condition. Dr. Wang was present for the entire procedure.         Estimated Blood Loss (EBL): minimal            Implants: * No implants in log *    Specimens:   Specimen (24h ago, onward)             Start     Ordered    08/10/22 0758  Specimen to Pathology  RELEASE UPON ORDERING        References:    Click here for ordering Quick Tip   Question:  Release to patient  Answer:  Immediate    08/10/22 0758                        Condition: Good    Disposition: PACU - hemodynamically stable.    Misa Renae MD  [facility]  8/10/2022  2:05 PM

## 2022-08-16 LAB
ESTROGEN SERPL-MCNC: NORMAL PG/ML
INSULIN SERPL-ACNC: NORMAL U[IU]/ML
LAB AP CLINICAL INFORMATION: NORMAL
LAB AP GROSS DESCRIPTION: NORMAL
LAB AP REPORT FOOTNOTES: NORMAL
T3RU NFR SERPL: NORMAL %

## 2022-08-25 ENCOUNTER — OFFICE VISIT (OUTPATIENT)
Dept: SURGERY | Facility: CLINIC | Age: 42
End: 2022-08-25

## 2022-08-25 VITALS
SYSTOLIC BLOOD PRESSURE: 123 MMHG | RESPIRATION RATE: 20 BRPM | HEIGHT: 62 IN | TEMPERATURE: 98 F | DIASTOLIC BLOOD PRESSURE: 79 MMHG | BODY MASS INDEX: 26.06 KG/M2 | WEIGHT: 141.63 LBS | HEART RATE: 68 BPM | OXYGEN SATURATION: 98 %

## 2022-08-25 DIAGNOSIS — N63.10 BREAST MASS, RIGHT: Primary | ICD-10-CM

## 2022-08-25 PROCEDURE — 99215 OFFICE O/P EST HI 40 MIN: CPT | Mod: PBBFAC

## 2022-08-25 NOTE — PROGRESS NOTES
[facility] Note    CC: f/u for R breast lesion s/p excision    SUBJECTIVE    HPI:   Jovani Ward is a Divehi-speaking 41 y/o female with a Hx of R breast phylloides tumor s/p excision on 8/10/22 presenting for 2-week post-op f/u. She has been recovering well after her procedure and does not have any questions or concerns at this time and has been able to carry out her ADLs without issue. Denies incisional pain, erythema, induration, drainage, f/c, weakness, SOB, CP.     PMH:   History reviewed. No pertinent past medical history.    PSH:   Past Surgical History:   Procedure Laterality Date    BREAST MASS EXCISION Right 8/10/2022    Procedure: EXCISION, MASS, BREAST;  Surgeon: Margarita Wang MD;  [facility];  Service: General;  Laterality: Right;       FamHx:   Family History   Problem Relation Age of Onset    Diabetes Mother     Diabetes Brother     Diabetes Brother        SocHx:  Social History     Socioeconomic History    Marital status: Single   Tobacco Use    Smoking status: Never Smoker    Smokeless tobacco: Never Used   Substance and Sexual Activity    Alcohol use: Yes     Alcohol/week: 2.0 - 3.0 standard drinks     Types: 2 - 3 Cans of beer per week     Comment: occasionally    Drug use: Never    Sexual activity: Yes       Allergies:   Review of patient's allergies indicates:  No Known Allergies    Medications:  Current Outpatient Medications on File Prior to Visit   Medication Sig Dispense Refill    HYDROcodone-acetaminophen (NORCO) 5-325 mg per tablet Take 1 tablet by mouth every 6 (six) hours as needed for Pain. (Patient not taking: Reported on 8/25/2022) 10 tablet 0     Current Facility-Administered Medications on File Prior to Visit   Medication Dose Route Frequency Provider Last Rate Last Admin    LIDOcaine (PF) 10 mg/ml (1%) injection 10 mg  1 mL Intradermal Once NATALIE Brian             OBJECTIVE    Physical Exam:  General: NAD  HEENT:  Anicteric sclera  Resp: Unlabored respirations  Cardiac: RRR  Breast: R breast with well-healing excisional biopsy incision site clean, dry, intact without erythema, induration or discharge. No fluctuance appreciated over incision.  Abdomen: Soft, NT, ND  Extremities: Well perfused    Labs:  No recent labs    Micro/Path  Pathology Specimen - R Breast Tissue (8/10/22)  - Complex fibroadenomatous nodule with cystic papillary apocrine metaplasia, sclerosing adenosis, florid usual ductal hyperplasia and columnar cell hyperplasia.  - No evidence of malignancy.    Imaging:  None in interval    A/P  41 y/o female with a Hx of R breast phylloides tumor s/p excision on 8/10/22.   Recovering well post-operatively    - Ambulatory referral to family practice to establish care and to f/u on annual screening mammograms  - Screening mammogram ordered for April 2023  - RTC PRBELKIS Bhat MD  [facility], PGY-1

## 2025-06-05 NOTE — PROGRESS NOTES
Continued Stay SW/CM Assessment/Plan of Care Note       Active Substitute Decision Maker (SDM)    There are no active Substitute Decision Maker (SDM) on file.           Progress note:  Clinical review of chart completed; plan of care discussed during care rounds with RN and care team,     OT recommending [facility], PT pending.    MSW met with pt at bedside to discuss OT recs for MITZY and poss PT recommendations. Pt reporting she would be agreeable to MITZY at dc, reporting she has not been able to stand at this time. Pt agreeable to [facility] referrals sent near home. Referrals sent via [facility].    PASRR done.        See SW/CM flowsheets for other objective data.    Disposition Recommendations:  Preliminary discharge destination: Planned Discharge Destination: Location not determined at this time  SW/CM recommendation for discharge: Home, Home care, SNF (needs pending)    Destination Pharmacy:        Discharge Plan/Needs:     Continued Care and Services - Admitted Since 6/3/2025    No active coordination exists for this encounter.       Devices/ Equipment that need to be arranged for discharge: None at this time       Prior To Hospitalization:    Living Situation: Spouse and residing at House    .  Support Systems: Family members, Spouse   Home Devices/Equipment: Mobility assist device            Mobility Assist Devices: Four wheel walker   Type of Service Prior to Hospitalization: None               Patient/Family discharge goal (s):  Home       Prior Function        Ambulation in the Home: Modified  Independent (06/04/25 1137 : Gurmeet Leigh, OTR/L)       Current Function  Last Filed Values       None            Therapy Recommendations for Discharge:   PT:      Last Filed Values       None          OT:       Last Filed Values         Value Time User    OT Discharge Needs  therapy 5 or more times per week 6/4/2025 12:02 PM Gurmeet Leigh, OTR/L                Mobility Equipment Recommended for Discharge: tbd      Barriers to  Patient seen by Dr. NICKOLAS Bhat. Will return prn. Written and verbal discharge instructions given.     Discharge  Identified Barriers to Discharge/Transition Planning: Medical necessity for acute care, Pending diagnostic results/procedure, Consult Pending/Clearance

## (undated) DEVICE — GOWN POLY REINF BRTH SLV XL

## (undated) DEVICE — ELECTRODE BLADE INSULATED 1 IN

## (undated) DEVICE — SUT MCRYL PLUS 4-0 PS2 27IN

## (undated) DEVICE — ADHESIVE DERMABOND ADVANCED

## (undated) DEVICE — APPLICATOR CHLORAPREP ORN 26ML

## (undated) DEVICE — HANDLE DEVON RIGID OR LIGHT

## (undated) DEVICE — SUT 2/0 30IN SILK BLK BRAI

## (undated) DEVICE — GLOVE PROTEXIS BLUE LATEX 7.5

## (undated) DEVICE — GLOVE PROTEXIS LTX MICRO  7.5

## (undated) DEVICE — GLOVE PROTEXIS LTX MICRO 6

## (undated) DEVICE — GLOVE PROTEXIS LTX MICRO 8

## (undated) DEVICE — MANIFOLD 4 PORT

## (undated) DEVICE — Device

## (undated) DEVICE — NDL HYPO REG 25G X 1 1/2

## (undated) DEVICE — ADHESIVE MASTISOL VIAL 48/BX

## (undated) DEVICE — GLOVE PROTEXIS LTX MICRO 6.5

## (undated) DEVICE — SYR 10CC LUER LOCK

## (undated) DEVICE — SOL NACL IRR 1000ML BTL

## (undated) DEVICE — CLOSURE SKIN STERI STRIP 1/2X4

## (undated) DEVICE — GLOVE PROTEXIS BLUE LATEX 6.5